# Patient Record
Sex: FEMALE | Race: WHITE | Employment: FULL TIME | URBAN - METROPOLITAN AREA
[De-identification: names, ages, dates, MRNs, and addresses within clinical notes are randomized per-mention and may not be internally consistent; named-entity substitution may affect disease eponyms.]

---

## 2017-02-21 PROCEDURE — 88305 TISSUE EXAM BY PATHOLOGIST: CPT | Performed by: INTERNAL MEDICINE

## 2020-10-15 PROBLEM — N20.1 RIGHT URETERAL STONE: Status: ACTIVE | Noted: 2020-10-15

## 2022-03-23 ENCOUNTER — TELEPHONE (OUTPATIENT)
Dept: NEUROLOGY | Facility: CLINIC | Age: 57
End: 2022-03-23

## 2022-03-23 ENCOUNTER — OFFICE VISIT (OUTPATIENT)
Dept: NEUROLOGY | Facility: CLINIC | Age: 57
End: 2022-03-23
Payer: COMMERCIAL

## 2022-03-23 VITALS
HEART RATE: 82 BPM | RESPIRATION RATE: 16 BRPM | BODY MASS INDEX: 33.18 KG/M2 | SYSTOLIC BLOOD PRESSURE: 124 MMHG | WEIGHT: 169 LBS | DIASTOLIC BLOOD PRESSURE: 82 MMHG | HEIGHT: 60 IN

## 2022-03-23 DIAGNOSIS — G43.109 COMPLICATED MIGRAINE: Primary | ICD-10-CM

## 2022-03-23 PROCEDURE — 3074F SYST BP LT 130 MM HG: CPT | Performed by: OTHER

## 2022-03-23 PROCEDURE — 3079F DIAST BP 80-89 MM HG: CPT | Performed by: OTHER

## 2022-03-23 PROCEDURE — 99204 OFFICE O/P NEW MOD 45 MIN: CPT | Performed by: OTHER

## 2022-03-23 PROCEDURE — 3008F BODY MASS INDEX DOCD: CPT | Performed by: OTHER

## 2022-03-23 RX ORDER — LISINOPRIL 20 MG/1
20 TABLET ORAL DAILY
COMMUNITY
Start: 2022-03-16

## 2022-03-23 RX ORDER — ESTRADIOL 10 UG/1
10 INSERT VAGINAL
COMMUNITY
Start: 2022-03-21

## 2022-03-23 RX ORDER — DIVALPROEX SODIUM 250 MG/1
250 TABLET, EXTENDED RELEASE ORAL DAILY
Qty: 30 TABLET | Refills: 2 | Status: SHIPPED | OUTPATIENT
Start: 2022-03-23

## 2022-03-23 NOTE — TELEPHONE ENCOUNTER
Pharmacy calling to verify dose, states this is small dose and want to be sure. Advised that provider ordered and signed, so this is likely his intended dose. States they would like verification. OV note not complete at this time. Routed to provider.

## 2022-03-24 ENCOUNTER — TELEPHONE (OUTPATIENT)
Dept: NEUROLOGY | Facility: CLINIC | Age: 57
End: 2022-03-24

## 2022-03-24 NOTE — TELEPHONE ENCOUNTER
Pt stated she would like to try another rx similar to depakote. Pt stopped taking it due to hair loss.     Pt stated she is completing thyroid labs on 3/25/22    Call pt to advise

## 2022-03-31 NOTE — TELEPHONE ENCOUNTER
RN informed the patient Dr. Lenard Del Rio is on vacation and will be back next week. Pt would like to wait upon Dr. Bonita Dos Santos return into the office to review her request.    Pt did not have any further questions. Please advise to the question.

## 2022-04-05 NOTE — TELEPHONE ENCOUNTER
S: Medication- Hair loss    B: Dx: Migraines    A: Depakote-  Pt does not want to take due to Hx of Hair loss. Topiramate-Pt has a hx of SE shaky and anxious. Per pt Acupuncture and Botox has helped. R: RN will route to provider for recommendation.

## 2022-04-11 NOTE — TELEPHONE ENCOUNTER
RN relayed the information to the patient. Patient was hoping for an alternative medication. RN advised at this time there is not a medication he can order that you do not have side effects to. RN encouraged to monitor for symptoms and call if she starts to experience any reoccurring symptoms.

## 2022-04-11 NOTE — TELEPHONE ENCOUNTER
Fine to not take Depakote - just monitor for symptoms - no additional medication at this time as has side effects and concerns with multiple medications

## 2022-09-20 ENCOUNTER — TELEPHONE (OUTPATIENT)
Dept: NEUROLOGY | Facility: CLINIC | Age: 57
End: 2022-09-20

## 2022-09-20 NOTE — TELEPHONE ENCOUNTER
Pt stated she was called by [employer] and advised she was in collections for ov on 3/23/22 for amount of $26.25. Pt stated she paid full amount on 9/20/22. Pt upset because she never received any invoices from [employer]. Pt wants to make sure her balance is $0.00. Pt also stated she was dissastified with her continuation of care with Dr. Sena Jeffries after ov on 3/23/22. Pt stated she left message on 3/24/22 and did not hear back with redirection from nurse of Dr. Sena Jeffries until 4/11/22. Pt was upset with not being provided another medication alternative and to call only if symptoms persisted or increased. Due to this pt decided to go with another neurologist outside of THE [facility]. See TE 3/24/22. Advised will let supervisor know to follow up with pts concerns with care and billing.